# Patient Record
Sex: FEMALE | Race: WHITE | URBAN - METROPOLITAN AREA
[De-identification: names, ages, dates, MRNs, and addresses within clinical notes are randomized per-mention and may not be internally consistent; named-entity substitution may affect disease eponyms.]

---

## 2021-10-07 ENCOUNTER — OFFICE (OUTPATIENT)
Dept: URBAN - METROPOLITAN AREA PATHOLOGY 4 | Facility: PATHOLOGY | Age: 51
End: 2021-10-07

## 2021-10-07 DIAGNOSIS — Z12.11 ENCOUNTER FOR SCREENING FOR MALIGNANT NEOPLASM OF COLON: ICD-10-CM

## 2021-10-07 DIAGNOSIS — K63.5 POLYP OF COLON: ICD-10-CM

## 2021-10-07 PROCEDURE — 88305 TISSUE EXAM BY PATHOLOGIST: CPT | Mod: 33 | Performed by: INTERNAL MEDICINE

## 2024-10-03 ENCOUNTER — OFFICE VISIT (OUTPATIENT)
Dept: FAMILY MEDICINE CLINIC | Facility: CLINIC | Age: 54
End: 2024-10-03
Payer: COMMERCIAL

## 2024-10-03 VITALS
BODY MASS INDEX: 26.88 KG/M2 | DIASTOLIC BLOOD PRESSURE: 90 MMHG | RESPIRATION RATE: 14 BRPM | WEIGHT: 142.4 LBS | HEART RATE: 69 BPM | HEIGHT: 61 IN | OXYGEN SATURATION: 96 % | SYSTOLIC BLOOD PRESSURE: 128 MMHG | TEMPERATURE: 97.5 F

## 2024-10-03 DIAGNOSIS — Z00.00 ANNUAL PHYSICAL EXAM: Primary | ICD-10-CM

## 2024-10-03 DIAGNOSIS — I10 PRIMARY HYPERTENSION: ICD-10-CM

## 2024-10-03 DIAGNOSIS — Z13.6 SCREENING FOR CARDIOVASCULAR CONDITION: ICD-10-CM

## 2024-10-03 DIAGNOSIS — Z13.1 SCREENING FOR DIABETES MELLITUS: ICD-10-CM

## 2024-10-03 DIAGNOSIS — Z13.220 SCREENING FOR CHOLESTEROL LEVEL: ICD-10-CM

## 2024-10-03 DIAGNOSIS — Z82.49 FAMILY HISTORY OF HEART DISEASE IN MALE FAMILY MEMBER BEFORE AGE 55: ICD-10-CM

## 2024-10-03 DIAGNOSIS — Z23 NEED FOR IMMUNIZATION AGAINST INFLUENZA: ICD-10-CM

## 2024-10-03 PROCEDURE — 99386 PREV VISIT NEW AGE 40-64: CPT | Performed by: FAMILY MEDICINE

## 2024-10-03 PROCEDURE — 90656 IIV3 VACC NO PRSV 0.5 ML IM: CPT | Performed by: FAMILY MEDICINE

## 2024-10-03 PROCEDURE — 90471 IMMUNIZATION ADMIN: CPT | Performed by: FAMILY MEDICINE

## 2024-10-03 RX ORDER — ESTRADIOL 0.1 MG/D
PATCH, EXTENDED RELEASE TRANSDERMAL
COMMUNITY
Start: 2024-08-15

## 2024-10-03 NOTE — PROGRESS NOTES
"Chief Complaint   Patient presents with    [facility]       Subjective      Susannah Owens is a 54 y.o. who presents for Annual Physical and to establish care    Sleep. 7 hours per night.     Diet. Lowering sodium and processed sugars. Minimal junk food and fast food. Drinks water and coffee    Physical Activity. Walks 30 minutes per day for exercise. Step count per day equals 4 miles per day.     SH: Currently down to 3-6 cigarettes per day. Job is sedentary.    Patient does have some concerns about her blood pressure.  Her mother began taking blood pressure medicine in her late 30s.  Patient reports episodes of throbbing headache with pulsating sensation in the neck.  She has a home blood pressure monitor and systolics are between 140 and 150.    The following portions of the patient's history were reviewed and updated as appropriate: allergies, current medications, past family history, past medical history, past social history, past surgical history, and problem list.    Review of Systems   Neurological:  Positive for headache.   All other systems reviewed and are negative.      Objective   Vital Signs:  /90   Pulse 69   Temp 97.5 °F (36.4 °C)   Resp 14   Ht 154.9 cm (61\")   Wt 64.6 kg (142 lb 6.4 oz)   SpO2 96%   BMI 26.91 kg/m²               Physical Exam  Constitutional:       General: She is not in acute distress.     Appearance: Normal appearance. She is not ill-appearing.   HENT:      Head: Normocephalic and atraumatic.      Right Ear: Tympanic membrane and ear canal normal.      Left Ear: Tympanic membrane and ear canal normal.      Nose: Nose normal.      Mouth/Throat:      Mouth: Mucous membranes are moist.      Pharynx: Oropharynx is clear. No posterior oropharyngeal erythema.   Eyes:      Extraocular Movements: Extraocular movements intact.      Conjunctiva/sclera: Conjunctivae normal.      Pupils: Pupils are equal, round, and reactive to light.   Cardiovascular:      Rate and Rhythm: " Normal rate and regular rhythm.      Pulses: Normal pulses.      Heart sounds: Normal heart sounds.   Pulmonary:      Effort: Pulmonary effort is normal. No respiratory distress.      Breath sounds: Normal breath sounds.   Abdominal:      General: Abdomen is flat. Bowel sounds are normal.      Palpations: Abdomen is soft.      Tenderness: There is no abdominal tenderness.   Musculoskeletal:         General: Normal range of motion.      Cervical back: Normal range of motion and neck supple.   Lymphadenopathy:      Cervical: No cervical adenopathy.   Skin:     General: Skin is warm and dry.      Capillary Refill: Capillary refill takes less than 2 seconds.   Neurological:      General: No focal deficit present.      Mental Status: She is alert and oriented to person, place, and time. Mental status is at baseline.      Cranial Nerves: No cranial nerve deficit.      Sensory: No sensory deficit.      Motor: No weakness.   Psychiatric:         Mood and Affect: Mood normal.         Behavior: Behavior normal.         Thought Content: Thought content normal.         Judgment: Judgment normal.          Result Review                     Assessment and Plan  Diagnoses and all orders for this visit:    1. Annual physical exam (Primary)    2. Family history of heart disease in male family member before age 55  -     Lipid Panel  -     Lipoprotein A (LPA)    3. Screening for cardiovascular condition  -     Lipid Panel  -     Comprehensive Metabolic Panel    4. Screening for diabetes mellitus  -     Comprehensive Metabolic Panel    5. Screening for cholesterol level  -     Lipid Panel    6. Primary hypertension  -     Lipid Panel  -     Comprehensive Metabolic Panel  -     CBC & Differential    7. Need for immunization against influenza  -     Fluzone >6mos ([id number])    Plan  1.  Patient is in average to above average physical health on initial presentation.  Further risk stratification for CVD is needed and labs have been  ordered for today for cholesterol and diabetes screening.  LP(a) is included due to family history of early coronary disease.    2.  Patient will begin blood pressure monitoring twice daily.  Appropriate technique was discussed.  She will return in 2 weeks for review of her blood pressure log.  Anticipate need for medication.  3.  Patient will obtain flu vaccine today.  We discussed COVID vaccination and due to the fact that she is expecting her first grandchild in January I have recommended COVID vaccination as well.  Shingles vaccine is also recommended for her.  4.  Patient should stop smoking  5.  Mammogram and CRC screening are up-to-date.        Follow Up  Return in about 2 weeks (around 10/17/2024) for Annual.  Patient was given instructions and counseling regarding her condition or for health maintenance advice. Please see specific information pulled into the AVS if appropriate.

## 2024-10-04 LAB
ALBUMIN SERPL-MCNC: 4.3 G/DL (ref 3.8–4.9)
ALP SERPL-CCNC: 44 IU/L (ref 44–121)
ALT SERPL-CCNC: 5 IU/L (ref 0–32)
AST SERPL-CCNC: 14 IU/L (ref 0–40)
BASOPHILS # BLD AUTO: 0 X10E3/UL (ref 0–0.2)
BASOPHILS NFR BLD AUTO: 1 %
BILIRUB SERPL-MCNC: 0.3 MG/DL (ref 0–1.2)
BUN SERPL-MCNC: 13 MG/DL (ref 6–24)
BUN/CREAT SERPL: 16 (ref 9–23)
CALCIUM SERPL-MCNC: 9.1 MG/DL (ref 8.7–10.2)
CHLORIDE SERPL-SCNC: 103 MMOL/L (ref 96–106)
CHOLEST SERPL-MCNC: 210 MG/DL (ref 100–199)
CO2 SERPL-SCNC: 25 MMOL/L (ref 20–29)
CREAT SERPL-MCNC: 0.8 MG/DL (ref 0.57–1)
EGFRCR SERPLBLD CKD-EPI 2021: 88 ML/MIN/1.73
EOSINOPHIL # BLD AUTO: 0.1 X10E3/UL (ref 0–0.4)
EOSINOPHIL NFR BLD AUTO: 2 %
ERYTHROCYTE [DISTWIDTH] IN BLOOD BY AUTOMATED COUNT: 12.5 % (ref 11.7–15.4)
GLOBULIN SER CALC-MCNC: 2 G/DL (ref 1.5–4.5)
GLUCOSE SERPL-MCNC: 84 MG/DL (ref 70–99)
HCT VFR BLD AUTO: 41.7 % (ref 34–46.6)
HDLC SERPL-MCNC: 59 MG/DL
HGB BLD-MCNC: 14.2 G/DL (ref 11.1–15.9)
IMM GRANULOCYTES # BLD AUTO: 0 X10E3/UL (ref 0–0.1)
IMM GRANULOCYTES NFR BLD AUTO: 0 %
LDLC SERPL CALC-MCNC: 137 MG/DL (ref 0–99)
LPA SERPL-SCNC: <8.4 NMOL/L
LYMPHOCYTES # BLD AUTO: 1.7 X10E3/UL (ref 0.7–3.1)
LYMPHOCYTES NFR BLD AUTO: 39 %
MCH RBC QN AUTO: 32.9 PG (ref 26.6–33)
MCHC RBC AUTO-ENTMCNC: 34.1 G/DL (ref 31.5–35.7)
MCV RBC AUTO: 97 FL (ref 79–97)
MONOCYTES # BLD AUTO: 0.3 X10E3/UL (ref 0.1–0.9)
MONOCYTES NFR BLD AUTO: 7 %
NEUTROPHILS # BLD AUTO: 2.2 X10E3/UL (ref 1.4–7)
NEUTROPHILS NFR BLD AUTO: 51 %
PLATELET # BLD AUTO: 262 X10E3/UL (ref 150–450)
POTASSIUM SERPL-SCNC: 4.5 MMOL/L (ref 3.5–5.2)
PROT SERPL-MCNC: 6.3 G/DL (ref 6–8.5)
RBC # BLD AUTO: 4.32 X10E6/UL (ref 3.77–5.28)
SODIUM SERPL-SCNC: 139 MMOL/L (ref 134–144)
TRIGL SERPL-MCNC: 77 MG/DL (ref 0–149)
VLDLC SERPL CALC-MCNC: 14 MG/DL (ref 5–40)
WBC # BLD AUTO: 4.4 X10E3/UL (ref 3.4–10.8)

## 2024-10-09 ENCOUNTER — PATIENT ROUNDING (BHMG ONLY) (OUTPATIENT)
Dept: FAMILY MEDICINE CLINIC | Facility: CLINIC | Age: 54
End: 2024-10-09
Payer: COMMERCIAL

## 2024-10-09 NOTE — PROGRESS NOTES
A My-Chart message has been sent to the patient for [place] [facility] [place]

## 2024-10-25 ENCOUNTER — OFFICE VISIT (OUTPATIENT)
Dept: FAMILY MEDICINE CLINIC | Facility: CLINIC | Age: 54
End: 2024-10-25
Payer: COMMERCIAL

## 2024-10-25 VITALS
RESPIRATION RATE: 20 BRPM | SYSTOLIC BLOOD PRESSURE: 140 MMHG | DIASTOLIC BLOOD PRESSURE: 88 MMHG | BODY MASS INDEX: 27.41 KG/M2 | HEART RATE: 75 BPM | TEMPERATURE: 97.7 F | WEIGHT: 145.2 LBS | OXYGEN SATURATION: 99 % | HEIGHT: 61 IN

## 2024-10-25 DIAGNOSIS — E78.00 HYPERCHOLESTEROLEMIA: ICD-10-CM

## 2024-10-25 DIAGNOSIS — I10 PRIMARY HYPERTENSION: Primary | ICD-10-CM

## 2024-10-25 PROCEDURE — 99214 OFFICE O/P EST MOD 30 MIN: CPT | Performed by: FAMILY MEDICINE

## 2024-10-25 RX ORDER — HYDROCHLOROTHIAZIDE 12.5 MG/1
12.5 TABLET ORAL DAILY
Qty: 30 TABLET | Refills: 1 | Status: SHIPPED | OUTPATIENT
Start: 2024-10-25

## 2024-10-25 NOTE — ASSESSMENT & PLAN NOTE
Lipid abnormalities are newly identified    Plan:  Lipid lowering therapy not prescribed due to TRIAL of TLC    Counseled patient on lifestyle modifications to help control hyperlipidemia.   Cholesterol lowering dietary information shared with patient.    Patient Treatment Goals:   LDL goal is under 100    Followup in 3 months.

## 2024-10-25 NOTE — ASSESSMENT & PLAN NOTE
Patient has new onset Hypertension  Medication changes per orders.  Dietary sodium restriction.  Counseled on importance of healthy eating and regular aerobic exercise  Stop smoking.  Blood pressure will be reassessed in 4 weeks.

## 2024-10-25 NOTE — PROGRESS NOTES
"Chief Complaint   Patient presents with    FU on BP readings        Subjective      Susannah Owens is a 54 y.o. who presents for follow-up of elevated blood pressures identified at last visit.  She brings in a blood pressure log with daily recordings of her blood pressure via a wrist monitor.  There is consistency and readings with morning blood pressures being in the 130s over 80s and 90s and evening blood pressures being higher with systolics in the 140s and diastolics in the 90s to 100s.    The following portions of the patient's history were reviewed and updated as appropriate: allergies, current medications, past family history, past medical history, past social history, past surgical history, and problem list.    Review of Systems    Objective   Vital Signs:  /88   Pulse 75   Temp 97.7 °F (36.5 °C)   Resp 20   Ht 154.9 cm (61\")   Wt 65.9 kg (145 lb 3.2 oz)   SpO2 99%   BMI 27.44 kg/m²               Physical Exam  Vitals reviewed.   Constitutional:       Appearance: Normal appearance.   Neurological:      Mental Status: She is alert.        The 10-year ASCVD risk score (Earline SIEGEL, et al., 2019) is: 6.6%    Values used to calculate the score:      Age: 54 years      Sex: Female      Is Non- : No      Diabetic: No      Tobacco smoker: Yes      Systolic Blood Pressure: 140 mmHg      Is BP treated: Yes      HDL Cholesterol: 59 mg/dL      Total Cholesterol: 210 mg/dL      Result Review   The following data was reviewed by: Star Aldrich MD on 10/25/2024:  Common labs          10/3/2024    12:08   Common Labs   Glucose 84    BUN 13    Creatinine 0.80    Sodium 139    Potassium 4.5    Chloride 103    Calcium 9.1    Total Protein 6.3    Albumin 4.3    Total Bilirubin 0.3    Alkaline Phosphatase 44    AST (SGOT) 14    ALT (SGPT) 5    WBC 4.4    Hemoglobin 14.2    Hematocrit 41.7    Platelets 262    Total Cholesterol 210    Triglycerides 77    HDL Cholesterol 59    LDL Cholesterol "  137                    Assessment and Plan  Diagnoses and all orders for this visit:    1. Primary hypertension (Primary)  Assessment & Plan:  Patient has new onset Hypertension  Medication changes per orders.  Dietary sodium restriction.  Counseled on importance of healthy eating and regular aerobic exercise  Stop smoking.  Blood pressure will be reassessed in 4 weeks.    Orders:  -     hydroCHLOROthiazide 12.5 MG tablet; Take 1 tablet by mouth Daily.  Dispense: 30 tablet; Refill: 1    2. Hypercholesterolemia  Assessment & Plan:   Lipid abnormalities are newly identified    Plan:  Lipid lowering therapy not prescribed due to TRIAL of TLC    Counseled patient on lifestyle modifications to help control hyperlipidemia.   Cholesterol lowering dietary information shared with patient.    Patient Treatment Goals:   LDL goal is under 100    Followup in 3 months.              Follow Up  Return in about 4 weeks (around 11/22/2024) for Recheck Hypertension.  Patient was given instructions and counseling regarding her condition or for health maintenance advice. Please see specific information pulled into the AVS if appropriate.

## 2024-11-22 ENCOUNTER — OFFICE VISIT (OUTPATIENT)
Dept: FAMILY MEDICINE CLINIC | Facility: CLINIC | Age: 54
End: 2024-11-22
Payer: COMMERCIAL

## 2024-11-22 VITALS
BODY MASS INDEX: 27.53 KG/M2 | RESPIRATION RATE: 20 BRPM | HEIGHT: 61 IN | HEART RATE: 77 BPM | TEMPERATURE: 98.4 F | WEIGHT: 145.8 LBS | DIASTOLIC BLOOD PRESSURE: 82 MMHG | OXYGEN SATURATION: 98 % | SYSTOLIC BLOOD PRESSURE: 125 MMHG

## 2024-11-22 DIAGNOSIS — L73.9 FOLLICULITIS: ICD-10-CM

## 2024-11-22 DIAGNOSIS — I10 PRIMARY HYPERTENSION: Primary | ICD-10-CM

## 2024-11-22 PROCEDURE — 99213 OFFICE O/P EST LOW 20 MIN: CPT | Performed by: FAMILY MEDICINE

## 2024-11-22 RX ORDER — HYDROCHLOROTHIAZIDE 12.5 MG/1
12.5 TABLET ORAL DAILY
Qty: 30 TABLET | Refills: 1 | Status: SHIPPED | OUTPATIENT
Start: 2024-11-22

## 2024-11-22 RX ORDER — CEPHALEXIN 500 MG/1
1000 CAPSULE ORAL 2 TIMES DAILY
Qty: 28 CAPSULE | Refills: 0 | Status: SHIPPED | OUTPATIENT
Start: 2024-11-22

## 2024-11-22 NOTE — PROGRESS NOTES
"Chief Complaint   Patient presents with    Follow-up    Hypertension       Subjective      Susannah Owens is a 54 y.o. who presents for hypertension follow-up.  While patient feels better than the last visit with hydrochlorothiazide 12-1/2 mg daily her home monitoring via a wrist cuff shows persistent elevations in blood pressures with systolics in the 140s and diastolics between 90 and 100 similar to her report from last visit.    Patient also reports an area of folliculitis in the inguinal area from grooming.    Objective   Vital Signs:  /82   Pulse 77   Temp 98.4 °F (36.9 °C)   Resp 20   Ht 154.9 cm (61\")   Wt 66.1 kg (145 lb 12.8 oz)   SpO2 98%   BMI 27.55 kg/m²     Physical Exam  Vitals reviewed.   Constitutional:       Appearance: Normal appearance.   Neurological:      Mental Status: She is alert.          Result Review   The following data was reviewed by: Star Aldrich MD on 11/22/2024:    Data reviewed : Patient home blood pressure log with 140s over 90s to 100s               Assessment and Plan  Diagnoses and all orders for this visit:    1. Primary hypertension (Primary)  Comments:  Unchanged via home monitoring.  Patient will purchase a arm cuff.  Continue daily monitoring.  Reassess in 1 month  Orders:  -     hydroCHLOROthiazide 12.5 MG tablet; Take 1 tablet by mouth Daily.  Dispense: 30 tablet; Refill: 1    2. Folliculitis  Comments:  Newly identified.  Given Keflex for 1 week  Orders:  -     cephalexin (Keflex) 500 MG capsule; Take 2 capsules by mouth 2 (Two) Times a Day.  Dispense: 28 capsule; Refill: 0            Follow Up  Return in about 1 month (around 12/22/2024) for Next scheduled follow up Blood Pressure.  Patient was given instructions and counseling regarding her condition or for health maintenance advice. Please see specific information pulled into the AVS if appropriate.       "

## 2024-11-27 ENCOUNTER — OFFICE VISIT (OUTPATIENT)
Dept: FAMILY MEDICINE CLINIC | Facility: CLINIC | Age: 54
End: 2024-11-27
Payer: COMMERCIAL

## 2024-11-27 VITALS
TEMPERATURE: 98 F | SYSTOLIC BLOOD PRESSURE: 126 MMHG | HEART RATE: 66 BPM | RESPIRATION RATE: 12 BRPM | WEIGHT: 147 LBS | BODY MASS INDEX: 27.75 KG/M2 | DIASTOLIC BLOOD PRESSURE: 82 MMHG | OXYGEN SATURATION: 100 % | HEIGHT: 61 IN

## 2024-11-27 DIAGNOSIS — R05.9 COUGH, UNSPECIFIED TYPE: ICD-10-CM

## 2024-11-27 DIAGNOSIS — J01.00 ACUTE MAXILLARY SINUSITIS, RECURRENCE NOT SPECIFIED: Primary | ICD-10-CM

## 2024-11-27 LAB
EXPIRATION DATE: NORMAL
FLUAV AG UPPER RESP QL IA.RAPID: NOT DETECTED
FLUBV AG UPPER RESP QL IA.RAPID: NOT DETECTED
INTERNAL CONTROL: NORMAL
Lab: NORMAL
SARS-COV-2 AG UPPER RESP QL IA.RAPID: NOT DETECTED

## 2024-11-27 RX ORDER — TRIAMCINOLONE ACETONIDE 40 MG/ML
40 INJECTION, SUSPENSION INTRA-ARTICULAR; INTRAMUSCULAR ONCE
Status: COMPLETED | OUTPATIENT
Start: 2024-11-27 | End: 2024-11-27

## 2024-11-27 RX ADMIN — TRIAMCINOLONE ACETONIDE 40 MG: 40 INJECTION, SUSPENSION INTRA-ARTICULAR; INTRAMUSCULAR at 17:07

## 2024-12-06 NOTE — PROGRESS NOTES
"Chu Owens is a 54 y.o. female.     Cough       History of Present Illness  The patient presents for evaluation of cold symptoms.    She began experiencing cold symptoms on Sunday, which have since progressed from a cough to a head cold. She suspects it may be bronchitis due to the involvement of her sinus passages. She reports no wheezing or shortness of breath. She has undergone COVID-19 testing twice, both of which returned negative results. She reports no fevers or chills. She has a history of sinus infections but has not had one recently.    She is currently on an antibiotic regimen of Keflex 500 mg, taken twice daily.    She was having some issues with blood pressure and came in last week to get it checked. She mentioned a small blemish on the inside of her thigh, which she thought was a hair follicle. She was unable to clear it up, and it was causing her discomfort. Consequently, she was prescribed Keflex 500 mg twice daily.    ALLERGIES  She is allergic to PENICILLIN and CIPRO.       The following portions of the patient's history were reviewed and updated as appropriate: allergies, current medications, past family history, past medical history, past social history, past surgical history, and problem list.    Review of Systems   Respiratory:  Positive for cough.      As noted per HPI     Objective   Blood pressure 126/82, pulse 66, temperature 98 °F (36.7 °C), resp. rate 12, height 154.9 cm (61\"), weight 66.7 kg (147 lb), SpO2 100%.   Physical Exam  Vitals reviewed.   Constitutional:       Appearance: Normal appearance.   HENT:      Head: Normocephalic.      Right Ear: Tympanic membrane, ear canal and external ear normal.      Left Ear: Tympanic membrane, ear canal and external ear normal.      Nose: Congestion present.      Right Sinus: Maxillary sinus tenderness present.      Left Sinus: Maxillary sinus tenderness present.   Cardiovascular:      Rate and Rhythm: Normal rate and regular rhythm. "   Pulmonary:      Effort: Pulmonary effort is normal.      Breath sounds: Normal breath sounds.   Neurological:      Mental Status: She is alert and oriented to person, place, and time.   Psychiatric:         Mood and Affect: Mood normal.         Behavior: Behavior normal.         Results      Assessment & Plan   Assessment & Plan  1. Sinus infection.  Symptoms started on Sunday and have progressed from a cough to sinus congestion. There is fluid behind the left ear and irritation from drainage in the throat. No fever, chills, wheezing, or shortness of breath reported. Lungs sound clear. A Kenalog injection will be administered. The patient is currently on Cephalexin 500 mg twice daily for a separate issue, which may provide some dual coverage for the sinus infection.          Diagnoses and all orders for this visit:    1. Acute maxillary sinusitis, recurrence not specified (Primary)  -     triamcinolone acetonide (KENALOG-40) injection 40 mg    2. Cough, unspecified type  -     POCT SARS-CoV-2 Antigen RUBEN + Flu               Patient gives verbal consent to use nicole for today's visit

## 2024-12-27 ENCOUNTER — OFFICE VISIT (OUTPATIENT)
Dept: FAMILY MEDICINE CLINIC | Facility: CLINIC | Age: 54
End: 2024-12-27
Payer: COMMERCIAL

## 2024-12-27 VITALS
BODY MASS INDEX: 26.88 KG/M2 | DIASTOLIC BLOOD PRESSURE: 92 MMHG | WEIGHT: 142.4 LBS | HEART RATE: 63 BPM | RESPIRATION RATE: 20 BRPM | OXYGEN SATURATION: 99 % | HEIGHT: 61 IN | SYSTOLIC BLOOD PRESSURE: 130 MMHG | TEMPERATURE: 97.5 F

## 2024-12-27 DIAGNOSIS — I10 PRIMARY HYPERTENSION: ICD-10-CM

## 2024-12-27 PROCEDURE — 99214 OFFICE O/P EST MOD 30 MIN: CPT | Performed by: FAMILY MEDICINE

## 2024-12-27 RX ORDER — HYDROCHLOROTHIAZIDE 25 MG/1
25 TABLET ORAL DAILY
Qty: 30 TABLET | Refills: 1 | Status: SHIPPED | OUTPATIENT
Start: 2024-12-27

## 2024-12-27 NOTE — PROGRESS NOTES
"Chief Complaint   Patient presents with    Follow-up    Hypertension       Subjective      Susannah Owens is a 54 y.o. who presents for hypertension.  She has been monitoring blood pressures at home with an arm cuff which she admits is very painful to use.  She brings in a record of blood pressures over the last 2 weeks with systolics from 135-171 and diastolics all above 100.  She previously had a wrist cuff but she was no longer using this after the recommendation of an arm cuff.  She continues on a low-sodium diet and is taking her HCTZ 12.5 mg daily.  She states she is worried \"something is wrong with my heart\".    Answers submitted by the patient for this visit:  Office Visit on 12/27/2024  9:45 AM with Star Aldrich  Problem not listed (Submitted on 12/26/2024)  Chief Complaint: Other medical problem  Reason for appointment: Blood pressure check  abdominal pain: No  anorexia: No  joint pain: No  change in stool: No  chest pain: No  chills: No  nasal congestion: No  cough: No  diaphoresis: No  fatigue: No  fever: No  headaches: No  joint swelling: No  myalgias: No  nausea: No  neck pain: No  numbness: No  rash: No  sore throat: No  swollen glands: No  dysuria: No  vertigo: No  visual change: No  vomiting: No  weakness: No      The following portions of the patient's history were reviewed and updated as appropriate: allergies, current medications, past family history, past medical history, past social history, past surgical history, and problem list.    Review of Systems    Objective   Vital Signs:  /92   Pulse 63   Temp 97.5 °F (36.4 °C)   Resp 20   Ht 154.9 cm (61\")   Wt 64.6 kg (142 lb 6.4 oz)   SpO2 99%   BMI 26.91 kg/m²               Physical Exam  Vitals reviewed.   Constitutional:       Appearance: Normal appearance.   Cardiovascular:      Rate and Rhythm: Normal rate and regular rhythm.      Pulses: Normal pulses.      Heart sounds: Normal heart sounds.   Pulmonary:      Effort: Pulmonary effort " is normal.      Breath sounds: Normal breath sounds.   Neurological:      Mental Status: She is alert.          Result Review   The following data was reviewed by: Star Aldrich MD on 12/27/2024:  CMP          10/3/2024    12:08   CMP   Glucose 84    BUN 13    Creatinine 0.80    Sodium 139    Potassium 4.5    Chloride 103    Calcium 9.1    Total Protein 6.3    Albumin 4.3    Globulin 2.0    Total Bilirubin 0.3    Alkaline Phosphatase 44    AST (SGOT) 14    ALT (SGPT) 5    BUN/Creatinine Ratio 16      Lipid Panel          10/3/2024    12:08   Lipid Panel   Total Cholesterol 210    Triglycerides 77    HDL Cholesterol 59    VLDL Cholesterol 14    LDL Cholesterol  137      BMP          10/3/2024    12:08   BMP   BUN 13    Creatinine 0.80    Sodium 139    Potassium 4.5    Chloride 103    CO2 25    Calcium 9.1                    Assessment and Plan  Diagnoses and all orders for this visit:    1. Primary hypertension  Comments:  Not at goal of 130/80. Increase HCTZ to 25 mg. Cont. low sodium diet and regular aerobic activity. Use both arm and wrist cuff. Bring both to f/u  Orders:  -     hydroCHLOROthiazide 25 MG tablet; Take 1 tablet by mouth Daily.  Dispense: 30 tablet; Refill: 1            Follow Up  Return in about 4 weeks (around 1/24/2025) for Recheck Blood pressure.  Patient was given instructions and counseling regarding her condition or for health maintenance advice. Please see specific information pulled into the AVS if appropriate.

## 2025-01-10 ENCOUNTER — OFFICE VISIT (OUTPATIENT)
Dept: FAMILY MEDICINE CLINIC | Facility: CLINIC | Age: 55
End: 2025-01-10
Payer: COMMERCIAL

## 2025-01-10 VITALS
TEMPERATURE: 97.8 F | HEART RATE: 68 BPM | SYSTOLIC BLOOD PRESSURE: 138 MMHG | DIASTOLIC BLOOD PRESSURE: 84 MMHG | BODY MASS INDEX: 27.38 KG/M2 | HEIGHT: 61 IN | WEIGHT: 145 LBS | RESPIRATION RATE: 16 BRPM

## 2025-01-10 DIAGNOSIS — J02.9 SORE THROAT: Primary | ICD-10-CM

## 2025-01-10 LAB
EXPIRATION DATE: NORMAL
EXPIRATION DATE: NORMAL
FLUAV AG UPPER RESP QL IA.RAPID: NOT DETECTED
FLUBV AG UPPER RESP QL IA.RAPID: NOT DETECTED
INTERNAL CONTROL: NORMAL
INTERNAL CONTROL: NORMAL
Lab: NORMAL
Lab: NORMAL
S PYO AG THROAT QL: NEGATIVE
SARS-COV-2 AG UPPER RESP QL IA.RAPID: NOT DETECTED

## 2025-01-10 PROCEDURE — 96372 THER/PROPH/DIAG INJ SC/IM: CPT | Performed by: FAMILY MEDICINE

## 2025-01-10 PROCEDURE — 87880 STREP A ASSAY W/OPTIC: CPT | Performed by: FAMILY MEDICINE

## 2025-01-10 PROCEDURE — 87428 SARSCOV & INF VIR A&B AG IA: CPT | Performed by: FAMILY MEDICINE

## 2025-01-10 PROCEDURE — 99213 OFFICE O/P EST LOW 20 MIN: CPT | Performed by: FAMILY MEDICINE

## 2025-01-10 RX ORDER — PREDNISONE 20 MG/1
40 TABLET ORAL DAILY
Qty: 10 TABLET | Refills: 0 | Status: SHIPPED | OUTPATIENT
Start: 2025-01-10

## 2025-01-10 RX ORDER — TRIAMCINOLONE ACETONIDE 40 MG/ML
40 INJECTION, SUSPENSION INTRA-ARTICULAR; INTRAMUSCULAR ONCE
Status: COMPLETED | OUTPATIENT
Start: 2025-01-10 | End: 2025-01-10

## 2025-01-10 RX ADMIN — TRIAMCINOLONE ACETONIDE 40 MG: 40 INJECTION, SUSPENSION INTRA-ARTICULAR; INTRAMUSCULAR at 16:35

## 2025-01-10 NOTE — PROGRESS NOTES
Subjective   Susannah Owens is a 54 y.o. female.     History of Present Illness     Had HA 3 days ago  Then ST started then better yesterday  Then had mus and ST and felt worse overall today  No fever      The following portions of the patient's history were reviewed and updated as appropriate: allergies, current medications, past family history, past medical history, past social history, past surgical history, and problem list.    Review of Systems   Constitutional:  Negative for fever.   HENT:  Positive for sore throat.        Objective   Physical Exam  Vitals and nursing note reviewed.   Constitutional:       Appearance: She is well-developed.   HENT:      Head: Normocephalic and atraumatic.      Right Ear: Hearing, tympanic membrane, ear canal and external ear normal.      Left Ear: Hearing, tympanic membrane, ear canal and external ear normal.      Nose: Nose normal.      Mouth/Throat:      Mouth: Mucous membranes are moist.      Pharynx: Uvula midline. Posterior oropharyngeal erythema present. No oropharyngeal exudate.   Eyes:      Conjunctiva/sclera: Conjunctivae normal.   Cardiovascular:      Rate and Rhythm: Normal rate and regular rhythm.      Heart sounds: Normal heart sounds.   Pulmonary:      Effort: Pulmonary effort is normal.      Breath sounds: Normal breath sounds.   Musculoskeletal:      Cervical back: Normal range of motion.   Lymphadenopathy:      Cervical: No cervical adenopathy.   Psychiatric:         Behavior: Behavior normal.         Assessment & Plan   Diagnoses and all orders for this visit:    1. Sore throat (Primary)  -     POCT SARS-CoV-2 Antigen RUBEN + Flu  -     POCT rapid strep A  -     predniSONE (DELTASONE) 20 MG tablet; Take 2 tablets by mouth Daily.  Dispense: 10 tablet; Refill: 0  -     triamcinolone acetonide (KENALOG-40) injection 40 mg    Negative strep, flu, and COVID  Likely viral, ok pred burst.  Pt to call INB

## 2025-01-14 ENCOUNTER — TELEPHONE (OUTPATIENT)
Dept: FAMILY MEDICINE CLINIC | Facility: CLINIC | Age: 55
End: 2025-01-14
Payer: COMMERCIAL

## 2025-01-14 RX ORDER — SULFAMETHOXAZOLE AND TRIMETHOPRIM 800; 160 MG/1; MG/1
1 TABLET ORAL 2 TIMES DAILY
Qty: 20 TABLET | Refills: 0 | Status: SHIPPED | OUTPATIENT
Start: 2025-01-14 | End: 2025-01-20

## 2025-01-14 NOTE — TELEPHONE ENCOUNTER
Caller: Susannah Owens    Relationship: Self    Best call back number:   Telephone Information:   Mobile [phone]        What medication are you requesting: ANTIBIOTIC    What are your current symptoms: SINUS PRESSURE    How long have you been experiencing symptoms: 01/07/25    Have you had these symptoms before:    [x] Yes  [] No    Have you been treated for these symptoms before:   [x] Yes  [] No    If a prescription is needed, what is your preferred pharmacy and phone number: [id number] - [facility] [place], [place] - [facility] [address] [facility] [address] [facility] [phone] [facility] [phone] FX     Additional notes: PATIENT WAS SEEN FOR THIS ON 01/10/25

## 2025-01-17 ENCOUNTER — NURSE TRIAGE (OUTPATIENT)
Dept: CALL CENTER | Facility: HOSPITAL | Age: 55
End: 2025-01-17
Payer: COMMERCIAL

## 2025-01-17 NOTE — TELEPHONE ENCOUNTER
"Reason for Disposition  • Message left on identified voice mail    Additional Information  • Negative: [1] Caller is not with the adult (patient) AND [2] reporting urgent symptoms  • Negative: Lab result questions  • Negative: Medication questions  • Negative: Caller has already spoken to PCP or another triager  • Negative: RN needs further essential information from caller in order to complete triage  • Caller can't be reached by phone  • Negative: Caller is angry or rude (e.g., hangs up, verbally abusive, yelling)  • Negative: Caller hangs up  • Negative: Caller has already spoken with the PCP and has no further questions.  • Negative: Caller has already spoken with another triager and has no further questions.  • Negative: Caller has already spoken with another triager or PCP AND has further questions AND triager able to answer questions.  • Negative: Busy signal.  First attempt to contact caller.  Follow-up call scheduled within 15 minutes.  • Negative: No answer.  First attempt to contact caller.  Follow-up call scheduled within 15 minutes.  • Negative: Message left on unidentified voice mail.  Phone number verified.    Answer Assessment - Initial Assessment Questions  1. REASON FOR CALL or QUESTION: \"What is your reason for calling today?\" or \"How can I best help you?\" or \"What question do you have that I can help answer?\"      patient called left message for Dr. Aldrich on  stated having high bp reading, and heading to [facility], Called back no answer, informed patient that if heading to ER that the Er md would give further instructions    Answer Assessment - Initial Assessment Questions  N/A  patient called left message for Dr. Aldrich on  stated having high bp reading, and heading to [facility], Called back no answer, informed patient that if heading to ER that the Er md would give further instructions    Protocols used: Information Only Call - No Triage-ADULT-, No Contact or " Duplicate Contact Call-ADULT-AH

## 2025-01-20 ENCOUNTER — OFFICE VISIT (OUTPATIENT)
Dept: FAMILY MEDICINE CLINIC | Facility: CLINIC | Age: 55
End: 2025-01-20
Payer: COMMERCIAL

## 2025-01-20 VITALS
TEMPERATURE: 97.7 F | WEIGHT: 139.6 LBS | DIASTOLIC BLOOD PRESSURE: 80 MMHG | BODY MASS INDEX: 26.36 KG/M2 | SYSTOLIC BLOOD PRESSURE: 128 MMHG | HEART RATE: 69 BPM | RESPIRATION RATE: 20 BRPM | OXYGEN SATURATION: 98 % | HEIGHT: 61 IN

## 2025-01-20 DIAGNOSIS — R45.89 ANXIETY ABOUT HEALTH: ICD-10-CM

## 2025-01-20 DIAGNOSIS — F51.04 CHRONIC INSOMNIA: ICD-10-CM

## 2025-01-20 DIAGNOSIS — I10 PRIMARY HYPERTENSION: Primary | ICD-10-CM

## 2025-01-20 PROCEDURE — 99214 OFFICE O/P EST MOD 30 MIN: CPT | Performed by: FAMILY MEDICINE

## 2025-01-20 RX ORDER — OLMESARTAN MEDOXOMIL 20 MG/1
20 TABLET ORAL DAILY
Qty: 30 TABLET | Refills: 0 | Status: SHIPPED | OUTPATIENT
Start: 2025-01-20

## 2025-01-20 RX ORDER — LABETALOL 100 MG/1
100 TABLET, FILM COATED ORAL 2 TIMES DAILY
COMMUNITY
Start: 2025-01-18 | End: 2025-01-20 | Stop reason: SDUPTHER

## 2025-01-20 RX ORDER — LABETALOL 100 MG/1
100 TABLET, FILM COATED ORAL 2 TIMES DAILY
Qty: 60 TABLET | Refills: 0 | Status: SHIPPED | OUTPATIENT
Start: 2025-01-20

## 2025-01-20 RX ORDER — HYDROXYZINE HYDROCHLORIDE 25 MG/1
25 TABLET, FILM COATED ORAL EVERY 4 HOURS PRN
COMMUNITY
Start: 2025-01-18

## 2025-01-20 NOTE — ASSESSMENT & PLAN NOTE
Hypertension is uncontrolled  Medication changes per orders.  Blood pressure will be reassessedin 4 weeks.    Because of the patient's fear that her blood pressure is going to acutely impact her life she would like to be more aggressive with blood pressure treatment.  Patient will continue HCTZ daily and labetalol 100 mg twice daily and begin olmesartan 20 mg.  Follow-up in 1 month

## 2025-01-20 NOTE — ASSESSMENT & PLAN NOTE
Patient was made aware there are alternative medications.  She would like to try alternative treatments such as melatonin and sleepy time tea

## 2025-01-20 NOTE — PROGRESS NOTES
"Chief Complaint   Patient presents with    FU from formerly [facility] ER / HTN     sleep issues      Takes 3 Benadryl nightly for multiple years to sleep        Subjective      Susannah Owens is a 54 y.o. who presents for hypertension.  Patient visited the ER last week when she felt poorly during the week and upon measurement of her blood pressure systolics were in the 190s.  Systolic reached 210 in the emergency room.  Patient was given labetalol but even prior to that blood pressures have begun decreasing.  She presents for follow-up.  Labs are reportedly normal.  Patient believes if her blood pressure is under control she will no longer have anxiety about many other things as she admits that she is afraid she may die soon.  There is a strong family history of hypertension.  Patient is accompanied by her  today who also provides additional information regarding her use of Benadryl as well as a record of her blood pressure since her ER visit showing systolics in the 120s to 150s and diastolics in the 80s and 90s.    She also reports a lot of anxiety not only about her health but about many other circumstances.  She is expecting her first granddaughter.  Work is stressful, she has annoying coworkers.  She does not sleep well and will use Benadryl multiple times per week.  She will take up to 75 mg of Benadryl to help her sleep but when prescribed Vistaril for her anxiety after her ER visit she reports a sensation of palpitations.  She inquires about alternatives to help with sleep or to help as needed for anxiety.    The following portions of the patient's history were reviewed and updated as appropriate: allergies, current medications, past family history, past medical history, past social history, past surgical history, and problem list.    Review of Systems    Objective   Vital Signs:  /80   Pulse 69   Temp 97.7 °F (36.5 °C)   Resp 20   Ht 154.9 cm (61\")   Wt 63.3 kg (139 lb 9.6 oz)   SpO2 98%   BMI 26.38 kg/m² "               Physical Exam  Vitals reviewed.   Constitutional:       General: She is not in acute distress.     Appearance: She is not ill-appearing.   Neurological:      Mental Status: She is alert.   Psychiatric:         Mood and Affect: Mood is anxious.         Behavior: Behavior normal.         Thought Content: Thought content normal.         Cognition and Memory: Cognition normal.          Result Review                     Assessment and Plan  Diagnoses and all orders for this visit:    1. Primary hypertension (Primary)  Assessment & Plan:  Hypertension is uncontrolled  Medication changes per orders.  Blood pressure will be reassessedin 4 weeks.    Because of the patient's fear that her blood pressure is going to acutely impact her life she would like to be more aggressive with blood pressure treatment.  Patient will continue HCTZ daily and labetalol 100 mg twice daily and begin olmesartan 20 mg.  Follow-up in 1 month    Orders:  -     labetalol (NORMODYNE) 100 MG tablet; Take 1 tablet by mouth 2 (Two) Times a Day.  Dispense: 60 tablet; Refill: 0  -     olmesartan (Benicar) 20 MG tablet; Take 1 tablet by mouth Daily.  Dispense: 30 tablet; Refill: 0    2. Chronic insomnia  Assessment & Plan:  Patient was made aware there are alternative medications.  She would like to try alternative treatments such as melatonin and sleepy time tea      3. Anxiety about health  Comments:  Counseling recommended which patient declined.  Meditation, massage therapy, exercise, breathing exercises were also discussed as options        I spent 34 minutes caring for Susannah on this date of service. This time includes time spent by me in the following activities:preparing for the visit, obtaining and/or reviewing a separately obtained history, performing a medically appropriate examination and/or evaluation , counseling and educating the patient/family/caregiver, and documenting information in the medical record    Follow Up  No  follow-ups on file.  Patient was given instructions and counseling regarding her condition or for health maintenance advice. Please see specific information pulled into the AVS if appropriate.

## 2025-02-17 DIAGNOSIS — I10 PRIMARY HYPERTENSION: ICD-10-CM

## 2025-02-17 RX ORDER — LABETALOL 100 MG/1
100 TABLET, FILM COATED ORAL 2 TIMES DAILY
Qty: 60 TABLET | Refills: 0 | Status: SHIPPED | OUTPATIENT
Start: 2025-02-17

## 2025-02-17 RX ORDER — OLMESARTAN MEDOXOMIL 20 MG/1
20 TABLET ORAL DAILY
Qty: 30 TABLET | Refills: 0 | Status: SHIPPED | OUTPATIENT
Start: 2025-02-17

## 2025-02-17 NOTE — TELEPHONE ENCOUNTER
Caller: Susannah Owens    Relationship: Self    Best call back number: [phone]     Requested Prescriptions:   Requested Prescriptions     Pending Prescriptions Disp Refills    olmesartan (Benicar) 20 MG tablet 30 tablet 0     Sig: Take 1 tablet by mouth Daily.    labetalol (NORMODYNE) 100 MG tablet 60 tablet 0     Sig: Take 1 tablet by mouth 2 (Two) Times a Day.        Pharmacy where request should be sent: [id number] - [facility] [address] [place] [address] 7 AT [facility] [phone] [facility] [phone] FX     Last office visit with prescribing clinician: 1/20/2025   Last telemedicine visit with prescribing clinician: Visit date not found   Next office visit with prescribing clinician: 2/21/2025     Additional details provided by patient: PATIENT WILL BE OUT OF MEDICATION WEDNESDAY    Does the patient have less than a 3 day supply:  [x] Yes  [] No    Would you like a call back once the refill request has been completed: [] Yes [x] No    If the office needs to give you a call back, can they leave a voicemail: [] Yes [x] No    Bailey Valdovinos   02/17/25 11:10 EST

## 2025-02-25 ENCOUNTER — OFFICE VISIT (OUTPATIENT)
Dept: FAMILY MEDICINE CLINIC | Facility: CLINIC | Age: 55
End: 2025-02-25
Payer: COMMERCIAL

## 2025-02-25 VITALS
WEIGHT: 142 LBS | SYSTOLIC BLOOD PRESSURE: 102 MMHG | BODY MASS INDEX: 26.81 KG/M2 | RESPIRATION RATE: 14 BRPM | HEIGHT: 61 IN | DIASTOLIC BLOOD PRESSURE: 66 MMHG | OXYGEN SATURATION: 95 % | HEART RATE: 76 BPM | TEMPERATURE: 97.6 F

## 2025-02-25 DIAGNOSIS — I10 PRIMARY HYPERTENSION: Primary | ICD-10-CM

## 2025-02-25 DIAGNOSIS — I95.1 ORTHOSTATIC HYPOTENSION: ICD-10-CM

## 2025-02-25 PROCEDURE — 99214 OFFICE O/P EST MOD 30 MIN: CPT | Performed by: FAMILY MEDICINE

## 2025-02-25 RX ORDER — OLMESARTAN MEDOXOMIL 20 MG/1
10 TABLET ORAL DAILY
Start: 2025-02-25

## 2025-02-25 NOTE — ASSESSMENT & PLAN NOTE
Blood pressure is well-controlled but patient is experiencing side effects of treatment.  Side effects are most likely due to the labetalol and significant drops in her blood pressures.  Patient will continue HCTZ and labetalol at current dosing and reduce olmesartan to 10 mg in the evening.  Reassess in 1 month

## 2025-02-25 NOTE — PROGRESS NOTES
"Chief Complaint   Patient presents with    F/u HTN       Subjective      Susannah Owens is a 54 y.o. who presents for hypertension.  Patient is taking hydrochlorothiazide 25 mg in the morning, labetalol 100 mg twice daily and olmesartan 20 mg in the evening.  Since beginning this combination of medication she reports lack of energy, lightheadedness when standing, and onset of muscle soreness with exertion.  Symptoms usually improve around lunchtime.  She denies muscle cramps.  Her blood pressure log shows systolics consistently in the low 100s with diastolics in the 50s and 60s.    The following portions of the patient's history were reviewed and updated as appropriate: allergies, current medications, past family history, past medical history, past social history, past surgical history, and problem list.    Review of Systems    Objective   Vital Signs:  /66   Pulse 76   Temp 97.6 °F (36.4 °C)   Resp 14   Ht 154.9 cm (61\")   Wt 64.4 kg (142 lb)   SpO2 95%   BMI 26.83 kg/m²               Physical Exam  Vitals reviewed.   Constitutional:       Appearance: Normal appearance.   Neurological:      Mental Status: She is alert.          Result Review                     Assessment and Plan  Diagnoses and all orders for this visit:    1. Primary hypertension (Primary)  Assessment & Plan:  Blood pressure is well-controlled but patient is experiencing side effects of treatment.  Side effects are most likely due to the labetalol and significant drops in her blood pressures.  Patient will continue HCTZ and labetalol at current dosing and reduce olmesartan to 10 mg in the evening.  Reassess in 1 month    Orders:  -     olmesartan (Benicar) 20 MG tablet; Take 0.5 tablets by mouth Daily.    2. Orthostatic hypotension            Follow Up  Return in about 4 weeks (around 3/25/2025) for Recheck blood pressure.  Patient was given instructions and counseling regarding her condition or for health maintenance advice. Please see " specific information pulled into the AVS if appropriate.

## 2025-03-09 DIAGNOSIS — I10 PRIMARY HYPERTENSION: ICD-10-CM

## 2025-03-10 RX ORDER — HYDROCHLOROTHIAZIDE 25 MG/1
25 TABLET ORAL DAILY
Qty: 30 TABLET | Refills: 0 | OUTPATIENT
Start: 2025-03-10

## 2025-03-10 RX ORDER — HYDROCHLOROTHIAZIDE 25 MG/1
25 TABLET ORAL DAILY
Qty: 30 TABLET | Refills: 1 | Status: SHIPPED | OUTPATIENT
Start: 2025-03-10

## 2025-03-23 DIAGNOSIS — I10 PRIMARY HYPERTENSION: ICD-10-CM

## 2025-03-24 RX ORDER — LABETALOL 100 MG/1
100 TABLET, FILM COATED ORAL 2 TIMES DAILY
Qty: 60 TABLET | Refills: 0 | OUTPATIENT
Start: 2025-03-24

## 2025-03-24 RX ORDER — LABETALOL 100 MG/1
100 TABLET, FILM COATED ORAL 2 TIMES DAILY
Qty: 60 TABLET | Refills: 0 | Status: SHIPPED | OUTPATIENT
Start: 2025-03-24 | End: 2025-03-25 | Stop reason: SDUPTHER

## 2025-03-24 NOTE — TELEPHONE ENCOUNTER
Caller: Susannah Owens    Relationship: Self    Best call back number: [phone]     Requested Prescriptions:   Requested Prescriptions     Pending Prescriptions Disp Refills    labetalol (NORMODYNE) 100 MG tablet [Pharmacy Med Name: Labetalol HCl 100 MG Oral Tablet] 60 tablet 0     Sig: Take 1 tablet by mouth twice daily      PATIENT IS COMPLETELY OUT OF MEDICATION AND NEEDS THIS SENT TO TODAY       Pharmacy where request should be sent: [facility] - [facility] - [place], [place] - [address] [facility] [address] AT [facility] [phone] [facility] [phone] FX     Last office visit with prescribing clinician: 2/25/2025   Last telemedicine visit with prescribing clinician: Visit date not found   Next office visit with prescribing clinician: 3/23/2025         Does the patient have less than a 3 day supply:  [x] Yes  [] No    Would you like a call back once the refill request has been completed: [] Yes [x] No    If the office needs to give you a call back, can they leave a voicemail: [] Yes [x] No    Wanda Cazares Rep   03/24/25 12:57 EDT

## 2025-03-24 NOTE — TELEPHONE ENCOUNTER
PATIENT IS COMPLETELY OUT OF MEDICATION AND NEEDS REFILL SENT TO [facility] AT [facility]. PLEASE CONTACT. I DID ADVISE PATIENT THAT WE HAVE 72 HOURS TO RESPOND AND TO TRY AND NOTIFY OUR OFFICE AT LEAST 5 DAYS PRIOR TO HER BEING OUT OF MEDICATION

## 2025-03-25 ENCOUNTER — OFFICE VISIT (OUTPATIENT)
Dept: FAMILY MEDICINE CLINIC | Facility: CLINIC | Age: 55
End: 2025-03-25
Payer: COMMERCIAL

## 2025-03-25 VITALS
DIASTOLIC BLOOD PRESSURE: 76 MMHG | TEMPERATURE: 97.5 F | RESPIRATION RATE: 18 BRPM | HEART RATE: 68 BPM | HEIGHT: 61 IN | SYSTOLIC BLOOD PRESSURE: 88 MMHG | BODY MASS INDEX: 27 KG/M2 | WEIGHT: 143 LBS

## 2025-03-25 DIAGNOSIS — N64.4 BREAST PAIN: ICD-10-CM

## 2025-03-25 DIAGNOSIS — I10 PRIMARY HYPERTENSION: Primary | ICD-10-CM

## 2025-03-25 DIAGNOSIS — M54.9 ACUTE BACK PAIN, UNSPECIFIED BACK LOCATION, UNSPECIFIED BACK PAIN LATERALITY: ICD-10-CM

## 2025-03-25 PROCEDURE — 99214 OFFICE O/P EST MOD 30 MIN: CPT | Performed by: FAMILY MEDICINE

## 2025-03-25 RX ORDER — LABETALOL 100 MG/1
100 TABLET, FILM COATED ORAL 2 TIMES DAILY
Qty: 60 TABLET | Refills: 1 | Status: SHIPPED | OUTPATIENT
Start: 2025-03-25

## 2025-03-25 RX ORDER — HYDROCHLOROTHIAZIDE 25 MG/1
25 TABLET ORAL DAILY
Qty: 30 TABLET | Refills: 1 | Status: SHIPPED | OUTPATIENT
Start: 2025-03-25

## 2025-03-25 NOTE — PROGRESS NOTES
"Chief Complaint   Patient presents with    Hypertension     4 week f/u        Subjective      Susannah Owens is a 54 y.o. who presents for hypertension follow-up.  Blood pressures are low with systolics in the 90s and low 100s with diastolics in the 50s and 60s.  Pulse rate is between high 50s and low 70s.  She reports over the last 2 months onset of back pain and bilateral breast pain.  She believes symptoms began shortly after beginning labetalol and olmesartan.  Her last mammogram was in November and was unremarkable.    The following portions of the patient's history were reviewed and updated as appropriate: allergies, current medications, past family history, past medical history, past social history, past surgical history, and problem list.    Review of Systems    Objective   Vital Signs:  BP (!) 88/76   Pulse 68   Temp 97.5 °F (36.4 °C)   Resp 18   Ht 154.9 cm (61\")   Wt 64.9 kg (143 lb)   BMI 27.02 kg/m²               Physical Exam  Constitutional:       General: She is not in acute distress.     Appearance: Normal appearance. She is not ill-appearing.   Neurological:      Mental Status: She is alert.   Psychiatric:         Mood and Affect: Mood normal.          Result Review                     Assessment and Plan  Diagnoses and all orders for this visit:    1. Primary hypertension (Primary)  Comments:  Blood pressure is now too low.  Experiencing side effects of treatment.  Discontinue olmesartan.  Reassess in 1 month both BP and side effects  Orders:  -     hydroCHLOROthiazide 25 MG tablet; Take 1 tablet by mouth Daily.  Dispense: 30 tablet; Refill: 1  -     labetalol (NORMODYNE) 100 MG tablet; Take 1 tablet by mouth 2 (Two) Times a Day.  Dispense: 60 tablet; Refill: 1    2. Breast pain  Comments:  Side effect of medication.    3. Acute back pain, unspecified back location, unspecified back pain laterality  Comments:  Side effect of medication.            Follow Up  Return in about 4 weeks (around " 4/22/2025) for Recheck Blood Pressure.  Patient was given instructions and counseling regarding her condition or for health maintenance advice. Please see specific information pulled into the AVS if appropriate.

## 2025-04-10 ENCOUNTER — TELEPHONE (OUTPATIENT)
Dept: FAMILY MEDICINE CLINIC | Facility: CLINIC | Age: 55
End: 2025-04-10
Payer: COMMERCIAL

## 2025-04-10 NOTE — TELEPHONE ENCOUNTER
[facility].. [facility]   [facility]ES SHE HAS BEEN HAVING REALLY HIGH BLOOD PRESSURE. SHE SAID SHE HAD BEEN GOING THRU STRESS THIS WEEK AND HER BP HAS BEEN GOING UP EVERY DAY. SHE'S BEEN GETTING VERY SEVER HEADACHES., AVERAGE HAS BEEN 140'S THIS WEEK.     PATIENT IS WONDERING IF SHE SHOULD START BACK ON OMASARTEN.     PATIENT ASK'S IF A NURSE CAN PLEASE CALL BACK AND GIVE HER ADVICE.     VERY CONCERNED ABOUT HER BP STAYING HIGH.

## 2025-04-10 NOTE — TELEPHONE ENCOUNTER
3-26-25   95/56  3-27-25   99/56    &    103/72  3-28-25   120/74  3-30-25   114/69  3-31-25   112/67  4-01-25   105/72  4-03-25   111/69  4-04-25   132/95  4-08-25   132/95  4-10-25   148/93    &   148/98    Pt is going to restart her hafl a pill of Benicar until you return next week and will continue to monitor her BP over the weekend.  She does not want to restart the Benicar because it makes her feel so bad. She is certain the BP started climbing due to the stress of a family members illness that has been hospitalized. She is asking for a short term med to use for her anxiety when it peaks so she can stop the Benicar again. I informed her you were out til Monday and would address this when you return.

## 2025-04-12 NOTE — TELEPHONE ENCOUNTER
A blood pressure in the 140's is not causing a headache. I suggest she cut the previous rx of olmesartan in half for a dose of 10 mg. Monitor BP as she is currently doing and we will discuss at her follow up on 4/25

## 2025-04-25 ENCOUNTER — OFFICE VISIT (OUTPATIENT)
Dept: FAMILY MEDICINE CLINIC | Facility: CLINIC | Age: 55
End: 2025-04-25
Payer: COMMERCIAL

## 2025-04-25 VITALS
HEIGHT: 61 IN | WEIGHT: 141.6 LBS | HEART RATE: 77 BPM | DIASTOLIC BLOOD PRESSURE: 85 MMHG | SYSTOLIC BLOOD PRESSURE: 128 MMHG | OXYGEN SATURATION: 98 % | RESPIRATION RATE: 20 BRPM | TEMPERATURE: 98 F | BODY MASS INDEX: 26.73 KG/M2

## 2025-04-25 DIAGNOSIS — I10 PRIMARY HYPERTENSION: Primary | ICD-10-CM

## 2025-04-25 PROCEDURE — 99214 OFFICE O/P EST MOD 30 MIN: CPT | Performed by: FAMILY MEDICINE

## 2025-04-25 RX ORDER — LOSARTAN POTASSIUM 25 MG/1
25 TABLET ORAL DAILY
Qty: 30 TABLET | Refills: 0 | Status: SHIPPED | OUTPATIENT
Start: 2025-04-25

## 2025-04-25 NOTE — PROGRESS NOTES
"Chief Complaint   Patient presents with    Follow-up    Hypertension       Subjective      Susannah Owens is a 54 y.o. who presents for HTN. She resumed olmesartan and had return of back pain and breast pain. Blood pressure increased during a stressful time.     Objective   Vital Signs:  /85   Pulse 77   Temp 98 °F (36.7 °C)   Resp 20   Ht 154.9 cm (61\")   Wt 64.2 kg (141 lb 9.6 oz)   SpO2 98%   BMI 26.76 kg/m²     Physical Exam  Vitals reviewed.   Constitutional:       Appearance: Normal appearance.   Neurological:      Mental Status: She is alert.          Result Review                     Assessment and Plan  Diagnoses and all orders for this visit:    1. Primary hypertension (Primary)  Comments:  Numbers better but too many side effects from olmesartan. Trial of different ARB. Reassess in one month.  Orders:  -     losartan (Cozaar) 25 MG tablet; Take 1 tablet by mouth Daily.  Dispense: 30 tablet; Refill: 0            Follow Up  Return in about 4 weeks (around 5/23/2025).  Patient was given instructions and counseling regarding her condition or for health maintenance advice. Please see specific information pulled into the AVS if appropriate.       "

## 2025-05-19 DIAGNOSIS — I10 PRIMARY HYPERTENSION: ICD-10-CM

## 2025-05-19 RX ORDER — LOSARTAN POTASSIUM 25 MG/1
25 TABLET ORAL DAILY
Qty: 30 TABLET | Refills: 0 | Status: SHIPPED | OUTPATIENT
Start: 2025-05-19

## 2025-05-29 ENCOUNTER — OFFICE VISIT (OUTPATIENT)
Dept: FAMILY MEDICINE CLINIC | Facility: CLINIC | Age: 55
End: 2025-05-29
Payer: COMMERCIAL

## 2025-05-29 VITALS
HEART RATE: 76 BPM | RESPIRATION RATE: 20 BRPM | DIASTOLIC BLOOD PRESSURE: 65 MMHG | BODY MASS INDEX: 26.62 KG/M2 | SYSTOLIC BLOOD PRESSURE: 115 MMHG | WEIGHT: 141 LBS | HEIGHT: 61 IN | TEMPERATURE: 97.5 F | OXYGEN SATURATION: 96 %

## 2025-05-29 DIAGNOSIS — E78.00 HYPERCHOLESTEROLEMIA: ICD-10-CM

## 2025-05-29 DIAGNOSIS — I10 PRIMARY HYPERTENSION: Primary | ICD-10-CM

## 2025-05-29 PROCEDURE — 99213 OFFICE O/P EST LOW 20 MIN: CPT | Performed by: FAMILY MEDICINE

## 2025-05-29 RX ORDER — HYDROCHLOROTHIAZIDE 25 MG/1
25 TABLET ORAL DAILY
Qty: 90 TABLET | Refills: 0 | Status: SHIPPED | OUTPATIENT
Start: 2025-05-29

## 2025-05-29 RX ORDER — LOSARTAN POTASSIUM 25 MG/1
25 TABLET ORAL DAILY
Qty: 90 TABLET | Refills: 0 | Status: SHIPPED | OUTPATIENT
Start: 2025-05-29

## 2025-05-29 RX ORDER — LABETALOL 100 MG/1
100 TABLET, FILM COATED ORAL 2 TIMES DAILY
Qty: 180 TABLET | Refills: 0 | Status: SHIPPED | OUTPATIENT
Start: 2025-05-29

## 2025-05-29 NOTE — PROGRESS NOTES
"Chief Complaint   Patient presents with    Follow-up    Hypertension       Subjective      Susannah Owens is a 54 y.o. who presents for hypertension follow-up.  She brings in her blood pressure log which is reviewed.  Daily blood pressures are consistently less than 120 systolic and less than 70 diastolic unless the patient indicates stress.  Blood pressures are slightly higher when patient experiences stress but still controlled.  She is feeling well.  After the change to losartan she noticed similar side effects as she experienced with olmesartan but after 2 weeks symptoms resolved.    The following portions of the patient's history were reviewed and updated as appropriate: allergies, current medications, past family history, past medical history, past social history, past surgical history, and problem list.    Review of Systems    Objective   Vital Signs:  /65   Pulse 76   Temp 97.5 °F (36.4 °C)   Resp 20   Ht 154.9 cm (61\")   Wt 64 kg (141 lb)   SpO2 96%   BMI 26.64 kg/m²               Physical Exam  Vitals reviewed.   Constitutional:       Appearance: Normal appearance.   Neurological:      Mental Status: She is alert.          Result Review                     Assessment and Plan  Diagnoses and all orders for this visit:    1. Primary hypertension (Primary)  Assessment & Plan:  Hypertension is stable and controlled  Continue current treatment regimen.  Blood pressure will be reassessed in 3 months.    Orders:  -     losartan (Cozaar) 25 MG tablet; Take 1 tablet by mouth Daily.  Dispense: 90 tablet; Refill: 0  -     hydroCHLOROthiazide 25 MG tablet; Take 1 tablet by mouth Daily.  Dispense: 90 tablet; Refill: 0  -     labetalol (NORMODYNE) 100 MG tablet; Take 1 tablet by mouth 2 (Two) Times a Day.  Dispense: 180 tablet; Refill: 0    2. Hypercholesterolemia  -     Lipid Panel; Future            Follow Up  Return in about 3 months (around 8/29/2025) for Next scheduled follow up.  Patient was given " instructions and counseling regarding her condition or for health maintenance advice. Please see specific information pulled into the AVS if appropriate.